# Patient Record
Sex: FEMALE | Race: WHITE | Employment: FULL TIME | URBAN - METROPOLITAN AREA
[De-identification: names, ages, dates, MRNs, and addresses within clinical notes are randomized per-mention and may not be internally consistent; named-entity substitution may affect disease eponyms.]

---

## 2020-01-11 ENCOUNTER — HOSPITAL ENCOUNTER (EMERGENCY)
Age: 50
Discharge: HOME | End: 2020-01-11
Attending: EMERGENCY MEDICINE
Payer: COMMERCIAL

## 2020-01-11 VITALS
SYSTOLIC BLOOD PRESSURE: 138 MMHG | HEIGHT: 68 IN | WEIGHT: 185 LBS | DIASTOLIC BLOOD PRESSURE: 59 MMHG | OXYGEN SATURATION: 97 % | HEART RATE: 66 BPM | BODY MASS INDEX: 28.04 KG/M2 | TEMPERATURE: 97.5 F | RESPIRATION RATE: 16 BRPM

## 2020-01-11 LAB
ABSOLUTE EOS #: 0.15 K/UL (ref 0–0.44)
ABSOLUTE IMMATURE GRANULOCYTE: 0.03 K/UL (ref 0–0.3)
ABSOLUTE LYMPH #: 2.51 K/UL (ref 1.1–3.7)
ABSOLUTE MONO #: 0.37 K/UL (ref 0.1–1.2)
ANION GAP SERPL CALCULATED.3IONS-SCNC: 13 MMOL/L (ref 9–17)
BASOPHILS # BLD: 1 % (ref 0–2)
BASOPHILS ABSOLUTE: 0.04 K/UL (ref 0–0.2)
BUN BLDV-MCNC: 14 MG/DL (ref 6–20)
BUN/CREAT BLD: 22 (ref 9–20)
CALCIUM SERPL-MCNC: 9.2 MG/DL (ref 8.6–10.4)
CHLORIDE BLD-SCNC: 100 MMOL/L (ref 98–107)
CO2: 26 MMOL/L (ref 20–31)
CREAT SERPL-MCNC: 0.63 MG/DL (ref 0.5–0.9)
DIFFERENTIAL TYPE: ABNORMAL
EOSINOPHILS RELATIVE PERCENT: 2 % (ref 1–4)
GFR AFRICAN AMERICAN: >60 ML/MIN
GFR NON-AFRICAN AMERICAN: >60 ML/MIN
GFR SERPL CREATININE-BSD FRML MDRD: ABNORMAL ML/MIN/{1.73_M2}
GFR SERPL CREATININE-BSD FRML MDRD: ABNORMAL ML/MIN/{1.73_M2}
GLUCOSE BLD-MCNC: 110 MG/DL (ref 70–99)
HCT VFR BLD CALC: 53.7 % (ref 36.3–47.1)
HEMOGLOBIN: 17.4 G/DL (ref 11.9–15.1)
IMMATURE GRANULOCYTES: 0 %
LYMPHOCYTES # BLD: 29 % (ref 24–43)
MCH RBC QN AUTO: 28.2 PG (ref 25.2–33.5)
MCHC RBC AUTO-ENTMCNC: 32.4 G/DL (ref 28.4–34.8)
MCV RBC AUTO: 87.2 FL (ref 82.6–102.9)
MONOCYTES # BLD: 4 % (ref 3–12)
MYOGLOBIN: 30 NG/ML (ref 25–58)
NRBC AUTOMATED: 0 PER 100 WBC
PDW BLD-RTO: 12.2 % (ref 11.8–14.4)
PLATELET # BLD: 223 K/UL (ref 138–453)
PLATELET ESTIMATE: ABNORMAL
PMV BLD AUTO: 9.5 FL (ref 8.1–13.5)
POTASSIUM SERPL-SCNC: 3.7 MMOL/L (ref 3.7–5.3)
RBC # BLD: 6.16 M/UL (ref 3.95–5.11)
RBC # BLD: ABNORMAL 10*6/UL
SEG NEUTROPHILS: 64 % (ref 36–65)
SEGMENTED NEUTROPHILS ABSOLUTE COUNT: 5.6 K/UL (ref 1.5–8.1)
SODIUM BLD-SCNC: 139 MMOL/L (ref 135–144)
TROPONIN INTERP: NORMAL
TROPONIN T: NORMAL NG/ML
TROPONIN, HIGH SENSITIVITY: <6 NG/L (ref 0–14)
WBC # BLD: 8.7 K/UL (ref 3.5–11.3)
WBC # BLD: ABNORMAL 10*3/UL

## 2020-01-11 PROCEDURE — 96360 HYDRATION IV INFUSION INIT: CPT

## 2020-01-11 PROCEDURE — 80048 BASIC METABOLIC PNL TOTAL CA: CPT

## 2020-01-11 PROCEDURE — 93005 ELECTROCARDIOGRAM TRACING: CPT | Performed by: PHYSICIAN ASSISTANT

## 2020-01-11 PROCEDURE — 84484 ASSAY OF TROPONIN QUANT: CPT

## 2020-01-11 PROCEDURE — 96361 HYDRATE IV INFUSION ADD-ON: CPT

## 2020-01-11 PROCEDURE — 85025 COMPLETE CBC W/AUTO DIFF WBC: CPT

## 2020-01-11 PROCEDURE — 83874 ASSAY OF MYOGLOBIN: CPT

## 2020-01-11 PROCEDURE — 99284 EMERGENCY DEPT VISIT MOD MDM: CPT

## 2020-01-11 PROCEDURE — 2580000003 HC RX 258: Performed by: PHYSICIAN ASSISTANT

## 2020-01-11 RX ORDER — ATORVASTATIN CALCIUM 20 MG/1
20 TABLET, FILM COATED ORAL DAILY
COMMUNITY

## 2020-01-11 RX ORDER — 0.9 % SODIUM CHLORIDE 0.9 %
1000 INTRAVENOUS SOLUTION INTRAVENOUS ONCE
Status: COMPLETED | OUTPATIENT
Start: 2020-01-11 | End: 2020-01-11

## 2020-01-11 RX ORDER — GABAPENTIN 300 MG/1
300 CAPSULE ORAL 4 TIMES DAILY
COMMUNITY

## 2020-01-11 RX ADMIN — SODIUM CHLORIDE 1000 ML: 9 INJECTION, SOLUTION INTRAVENOUS at 17:59

## 2020-01-11 NOTE — ED PROVIDER NOTES
Never       I personally evaluated and examined the patient in conjunction with the APC and agree with the assessment, treatment plan, and disposition of the patient as recorded by the APC.    Charly Gruber MD  Attending Emergency Physician          Darell Hua MD  01/11/20 0228

## 2020-01-11 NOTE — ED PROVIDER NOTES
level 4, 8 or more for level 5)     ED Triage Vitals [01/11/20 1752]   BP Temp Temp Source Pulse Resp SpO2 Height Weight   (!) 138/59 97.5 °F (36.4 °C) Oral 66 16 97 % 5' 8\" (1.727 m) 185 lb (83.9 kg)      Physical Exam  Constitutional:       Appearance: She is well-developed. HENT:      Head: Normocephalic and atraumatic. Neck:      Musculoskeletal: Normal range of motion and neck supple. Cardiovascular:      Rate and Rhythm: Normal rate and regular rhythm. Pulmonary:      Effort: Pulmonary effort is normal.      Breath sounds: Normal breath sounds. Abdominal:      Palpations: Abdomen is soft. Tenderness: There is no tenderness. Musculoskeletal: Normal range of motion. Skin:     General: Skin is warm. Findings: No rash. Neurological:      Mental Status: She is alert and oriented to person, place, and time.    Psychiatric:         Behavior: Behavior normal.                 DIAGNOSTIC RESULTS     EKG: All EKG's are interpreted by the Emergency Department Physician who either signs or Co-signs this chart in the absence of a cardiologist.    Charmaine Jarod .  66 bpm.  nsr  No st segment elevation    RADIOLOGY:   Non-plain film images such as CT, Ultrasound and MRI are read by the radiologist. Plain radiographic images arevisualized and preliminarily interpreted by the emergency physician with the below findings:        Interpretation per the Radiologist below, if available at thetime of this note:          ED BEDSIDE ULTRASOUND:   Performed by ED Physician - none    LABS:  Labs Reviewed   CBC WITH AUTO DIFFERENTIAL - Abnormal; Notable for the following components:       Result Value    RBC 6.16 (*)     Hemoglobin 17.4 (*)     Hematocrit 53.7 (*)     All other components within normal limits   BASIC METABOLIC PANEL - Abnormal; Notable for the following components:    Glucose 110 (*)     Bun/Cre Ratio 22 (*)     All other components within normal limits   TROP/MYOGLOBIN   TROP/MYOGLOBIN TROP/MYOGLOBIN       All other labs were within normal range or not returned as of this dictation. EMERGENCY DEPARTMENT COURSE and DIFFERENTIAL DIAGNOSIS/MDM:   Vitals:    Vitals:    01/11/20 1752   BP: (!) 138/59   Pulse: 66   Resp: 16   Temp: 97.5 °F (36.4 °C)   TempSrc: Oral   SpO2: 97%   Weight: 185 lb (83.9 kg)   Height: 5' 8\" (1.727 m)     Patient ambulate without difficulty. Feels much better after IV fluids. Work-up negative. Patient likely had a vasovagal event. Will discharge home. CONSULTS:  None    PROCEDURES:  Procedures        FINAL IMPRESSION      1.  Syncope and collapse          DISPOSITION/PLAN   DISPOSITION Decision To Discharge 01/11/2020 08:05:47 PM      PATIENTREFERRED TO:   Sandra Leach, [address]  [phone]    In 3 days        DISCHARGE MEDICATIONS:     New Prescriptions    No medications on file           (Please note that portions of this note were completed with a voice recognition program.  Efforts were made to edit thedictations but occasionally words are mis-transcribed.)    CHRISTINE Arteaga PA-C  01/11/20 2007

## 2020-01-12 LAB
EKG ATRIAL RATE: 66 BPM
EKG P AXIS: 59 DEGREES
EKG P-R INTERVAL: 146 MS
EKG Q-T INTERVAL: 394 MS
EKG QRS DURATION: 90 MS
EKG QTC CALCULATION (BAZETT): 413 MS
EKG R AXIS: 52 DEGREES
EKG T AXIS: 51 DEGREES
EKG VENTRICULAR RATE: 66 BPM

## 2020-06-13 ENCOUNTER — HOSPITAL ENCOUNTER (EMERGENCY)
Age: 50
Discharge: HOME | End: 2020-06-13
Attending: EMERGENCY MEDICINE
Payer: COMMERCIAL

## 2020-06-13 ENCOUNTER — APPOINTMENT (OUTPATIENT)
Dept: GENERAL RADIOLOGY | Age: 50
End: 2020-06-13
Payer: COMMERCIAL

## 2020-06-13 VITALS
HEART RATE: 86 BPM | DIASTOLIC BLOOD PRESSURE: 79 MMHG | HEIGHT: 68 IN | TEMPERATURE: 98.6 F | RESPIRATION RATE: 16 BRPM | WEIGHT: 190 LBS | SYSTOLIC BLOOD PRESSURE: 139 MMHG | BODY MASS INDEX: 28.79 KG/M2 | OXYGEN SATURATION: 97 %

## 2020-06-13 PROCEDURE — 6360000002 HC RX W HCPCS: Performed by: EMERGENCY MEDICINE

## 2020-06-13 PROCEDURE — 99283 EMERGENCY DEPT VISIT LOW MDM: CPT

## 2020-06-13 PROCEDURE — 6370000000 HC RX 637 (ALT 250 FOR IP): Performed by: EMERGENCY MEDICINE

## 2020-06-13 PROCEDURE — 96372 THER/PROPH/DIAG INJ SC/IM: CPT

## 2020-06-13 PROCEDURE — 73590 X-RAY EXAM OF LOWER LEG: CPT

## 2020-06-13 RX ORDER — ACETAMINOPHEN 500 MG
1000 TABLET ORAL ONCE
Status: COMPLETED | OUTPATIENT
Start: 2020-06-13 | End: 2020-06-13

## 2020-06-13 RX ORDER — KETOROLAC TROMETHAMINE 30 MG/ML
30 INJECTION, SOLUTION INTRAMUSCULAR; INTRAVENOUS ONCE
Status: COMPLETED | OUTPATIENT
Start: 2020-06-13 | End: 2020-06-13

## 2020-06-13 RX ADMIN — ACETAMINOPHEN 1000 MG: 500 TABLET ORAL at 13:17

## 2020-06-13 RX ADMIN — KETOROLAC TROMETHAMINE 30 MG: 30 INJECTION, SOLUTION INTRAMUSCULAR at 13:17

## 2020-06-13 ASSESSMENT — PAIN DESCRIPTION - LOCATION: LOCATION: LEG

## 2020-06-13 ASSESSMENT — PAIN SCALES - GENERAL
PAINLEVEL_OUTOF10: 7
PAINLEVEL_OUTOF10: 9
PAINLEVEL_OUTOF10: 9

## 2020-06-13 ASSESSMENT — PAIN DESCRIPTION - FREQUENCY: FREQUENCY: CONTINUOUS

## 2020-06-13 ASSESSMENT — PAIN DESCRIPTION - PAIN TYPE: TYPE: ACUTE PAIN

## 2020-06-13 ASSESSMENT — PAIN DESCRIPTION - DESCRIPTORS: DESCRIPTORS: CONSTANT;THROBBING;SPASM

## 2020-06-13 ASSESSMENT — PAIN DESCRIPTION - ORIENTATION: ORIENTATION: RIGHT

## 2020-06-13 NOTE — ED PROVIDER NOTES
EMERGENCY DEPARTMENT ENCOUNTER    Pt Name: cT Desouza  MRN: [id number]  [place] 1970  Date of evaluation: 20  CHIEF COMPLAINT       Chief Complaint   Patient presents with    Leg Injury     right calf     HISTORY OF PRESENT ILLNESS   Patient is a 79-year-old female who presents to the ED complaining of right calf pain. Pain started 2 weeks ago when she slipped on tile was bordering her swimming pool. Pain gradually improved until today when she was swimming in the water, pushed off her right leg to avoid an object and developed sharp tearing pain in her right calf. She had to hobble approximately 1 mile back to her car worsening her symptoms. Pain aggravated when putting weight on her leg. Also aggravated when flexing or extending her ankle. No bony deformities, swelling or redness. No numbness or tingling in lower extremities. She does not fever, cough, chest pain, shortness of breath, dental pain. REVIEW OF SYSTEMS     Review of Systems   All other systems reviewed and are negative. PASTMEDICAL HISTORY     Past Medical History:   Diagnosis Date    Degenerative disc disease, cervical     Hyperlipidemia      SURGICAL HISTORY       Past Surgical History:   Procedure Laterality Date     SECTION      NECK SURGERY      TUBAL LIGATION       CURRENT MEDICATIONS       Previous Medications    ATORVASTATIN (LIPITOR) 20 MG TABLET    Take 20 mg by mouth daily    GABAPENTIN (NEURONTIN) 300 MG CAPSULE    Take 300 mg by mouth 4 times daily. ALLERGIES     has No Known Allergies. FAMILY HISTORY     has no family status information on file.       SOCIAL HISTORY       Social History     Tobacco Use    Smoking status: Never Smoker    Smokeless tobacco: Never Used   Substance Use Topics    Alcohol use: Yes     Comment: rare    Drug use: Never     PHYSICAL EXAM     INITIAL VITALS: /79   Pulse 86   Temp 98.6 °F (37 °C) (Oral)   Resp 16   Ht 5' 8\" (1.727 m)   Wt 190 lb (86.2 kg) LMP 05/27/2020   SpO2 97%   BMI 28.89 kg/m²    Physical Exam  HENT:      Head: Normocephalic. Right Ear: External ear normal.      Left Ear: External ear normal.      Nose: Nose normal.   Eyes:      Conjunctiva/sclera: Conjunctivae normal.   Cardiovascular:      Rate and Rhythm: Normal rate. Pulmonary:      Effort: Pulmonary effort is normal.   Abdominal:      General: Abdomen is flat. Musculoskeletal:         General: Tenderness present. Comments: Tender right mid to distal calf. Mild swelling compared to left. Tenderness on flexion and extension of foot. Normal flexion extension of right hip and knee. Skin:     General: Skin is dry. Neurological:      Mental Status: She is alert. Mental status is at baseline. Psychiatric:         Mood and Affect: Mood normal.         Behavior: Behavior normal.         MEDICAL DECISION MAKING:   The patient is hemodynamically stable, afebrile, nontoxic-appearing. Physical exam notable for calf tenderness. Based on history and exam high clinical suspicion for gastrocnemius muscle tear. Low suspicion for Achilles tendon tear. ED plan for x-ray, ultrasound if available, walking boot, crutches. DIAGNOSTIC RESULTS   EKG:All EKG's are interpreted by the Emergency Department Physician who either signs or Co-signs this chart in the absence of a cardiologist.        RADIOLOGY:All plain film, CT, MRI, and formal ultrasound images (except ED bedside ultrasound) are read by the radiologist, see reports below, unless otherwisenoted in MDM or here. XR TIBIA FIBULA RIGHT (2 VIEWS)   Final Result   Negative right tibia/fibula with no acute osseous abnormality. US EXTREMITY RIGHT NON VASC LIMITED    (Results Pending)     LABS: All lab results were reviewed by myself, and all abnormals are listed below. Labs Reviewed - No data to display    EMERGENCY DEPARTMENTCOURSE:   Nursing notes reviewed.   At this time this is what I find, the patient appears

## 2023-01-11 ENCOUNTER — HOSPITAL ENCOUNTER (EMERGENCY)
Age: 53
Discharge: HOME | End: 2023-01-11
Attending: EMERGENCY MEDICINE
Payer: COMMERCIAL

## 2023-01-11 ENCOUNTER — APPOINTMENT (OUTPATIENT)
Dept: GENERAL RADIOLOGY | Age: 53
End: 2023-01-11
Payer: COMMERCIAL

## 2023-01-11 ENCOUNTER — APPOINTMENT (OUTPATIENT)
Dept: MRI IMAGING | Age: 53
End: 2023-01-11
Payer: COMMERCIAL

## 2023-01-11 VITALS
OXYGEN SATURATION: 97 % | TEMPERATURE: 97.3 F | HEIGHT: 68 IN | DIASTOLIC BLOOD PRESSURE: 86 MMHG | BODY MASS INDEX: 28.49 KG/M2 | WEIGHT: 188 LBS | RESPIRATION RATE: 20 BRPM | HEART RATE: 70 BPM | SYSTOLIC BLOOD PRESSURE: 139 MMHG

## 2023-01-11 DIAGNOSIS — R53.83 OTHER FATIGUE: Primary | ICD-10-CM

## 2023-01-11 LAB
ABSOLUTE EOS #: 0.11 K/UL (ref 0–0.44)
ABSOLUTE IMMATURE GRANULOCYTE: 0.02 K/UL (ref 0–0.3)
ABSOLUTE LYMPH #: 1.73 K/UL (ref 1.1–3.7)
ABSOLUTE MONO #: 0.3 K/UL (ref 0.1–1.2)
ALBUMIN SERPL-MCNC: 4.6 G/DL (ref 3.5–5.2)
ALP BLD-CCNC: 83 U/L (ref 35–104)
ALT SERPL-CCNC: 20 U/L (ref 5–33)
ANION GAP SERPL CALCULATED.3IONS-SCNC: 10 MMOL/L (ref 9–17)
AST SERPL-CCNC: 21 U/L
BASOPHILS # BLD: 0 % (ref 0–2)
BASOPHILS ABSOLUTE: <0.03 K/UL (ref 0–0.2)
BILIRUB SERPL-MCNC: 0.4 MG/DL (ref 0.3–1.2)
BUN BLDV-MCNC: 20 MG/DL (ref 6–20)
BUN/CREAT BLD: 29 (ref 9–20)
CALCIUM SERPL-MCNC: 9.4 MG/DL (ref 8.6–10.4)
CHLORIDE BLD-SCNC: 102 MMOL/L (ref 98–107)
CO2: 28 MMOL/L (ref 20–31)
CREAT SERPL-MCNC: 0.68 MG/DL (ref 0.5–0.9)
EKG ATRIAL RATE: 71 BPM
EKG P AXIS: 55 DEGREES
EKG P-R INTERVAL: 142 MS
EKG Q-T INTERVAL: 394 MS
EKG QRS DURATION: 90 MS
EKG QTC CALCULATION (BAZETT): 428 MS
EKG R AXIS: 42 DEGREES
EKG T AXIS: 42 DEGREES
EKG VENTRICULAR RATE: 71 BPM
EOSINOPHILS RELATIVE PERCENT: 2 % (ref 1–4)
GFR SERPL CREATININE-BSD FRML MDRD: >60 ML/MIN/1.73M2
GLUCOSE BLD-MCNC: 102 MG/DL (ref 70–99)
HCT VFR BLD CALC: 45 % (ref 36.3–47.1)
HEMOGLOBIN: 14.9 G/DL (ref 11.9–15.1)
IMMATURE GRANULOCYTES: 0 %
LYMPHOCYTES # BLD: 36 % (ref 24–43)
MAGNESIUM: 2 MG/DL (ref 1.6–2.6)
MCH RBC QN AUTO: 29.4 PG (ref 25.2–33.5)
MCHC RBC AUTO-ENTMCNC: 33.1 G/DL (ref 28.4–34.8)
MCV RBC AUTO: 88.9 FL (ref 82.6–102.9)
MONOCYTES # BLD: 6 % (ref 3–12)
NRBC AUTOMATED: 0 PER 100 WBC
PDW BLD-RTO: 12.7 % (ref 11.8–14.4)
PLATELET # BLD: 220 K/UL (ref 138–453)
PMV BLD AUTO: 9.5 FL (ref 8.1–13.5)
POTASSIUM SERPL-SCNC: 4 MMOL/L (ref 3.7–5.3)
RBC # BLD: 5.06 M/UL (ref 3.95–5.11)
SEG NEUTROPHILS: 56 % (ref 36–65)
SEGMENTED NEUTROPHILS ABSOLUTE COUNT: 2.59 K/UL (ref 1.5–8.1)
SODIUM BLD-SCNC: 140 MMOL/L (ref 135–144)
TOTAL PROTEIN: 7.2 G/DL (ref 6.4–8.3)
TROPONIN, HIGH SENSITIVITY: <6 NG/L (ref 0–14)
TSH SERPL DL<=0.05 MIU/L-ACNC: 0.71 UIU/ML (ref 0.3–5)
WBC # BLD: 4.8 K/UL (ref 3.5–11.3)

## 2023-01-11 PROCEDURE — A9579 GAD-BASE MR CONTRAST NOS,1ML: HCPCS | Performed by: EMERGENCY MEDICINE

## 2023-01-11 PROCEDURE — 84443 ASSAY THYROID STIM HORMONE: CPT

## 2023-01-11 PROCEDURE — 83735 ASSAY OF MAGNESIUM: CPT

## 2023-01-11 PROCEDURE — 96375 TX/PRO/DX INJ NEW DRUG ADDON: CPT

## 2023-01-11 PROCEDURE — 85025 COMPLETE CBC W/AUTO DIFF WBC: CPT

## 2023-01-11 PROCEDURE — 6360000002 HC RX W HCPCS: Performed by: EMERGENCY MEDICINE

## 2023-01-11 PROCEDURE — 93010 ELECTROCARDIOGRAM REPORT: CPT | Performed by: INTERNAL MEDICINE

## 2023-01-11 PROCEDURE — 96374 THER/PROPH/DIAG INJ IV PUSH: CPT

## 2023-01-11 PROCEDURE — 99285 EMERGENCY DEPT VISIT HI MDM: CPT

## 2023-01-11 PROCEDURE — 70553 MRI BRAIN STEM W/O & W/DYE: CPT

## 2023-01-11 PROCEDURE — 84484 ASSAY OF TROPONIN QUANT: CPT

## 2023-01-11 PROCEDURE — 80053 COMPREHEN METABOLIC PANEL: CPT

## 2023-01-11 PROCEDURE — 71045 X-RAY EXAM CHEST 1 VIEW: CPT

## 2023-01-11 PROCEDURE — 93005 ELECTROCARDIOGRAM TRACING: CPT | Performed by: EMERGENCY MEDICINE

## 2023-01-11 PROCEDURE — 2580000003 HC RX 258: Performed by: EMERGENCY MEDICINE

## 2023-01-11 PROCEDURE — 6360000004 HC RX CONTRAST MEDICATION: Performed by: EMERGENCY MEDICINE

## 2023-01-11 RX ORDER — SODIUM CHLORIDE 0.9 % (FLUSH) 0.9 %
10 SYRINGE (ML) INJECTION ONCE
Status: COMPLETED | OUTPATIENT
Start: 2023-01-11 | End: 2023-01-11

## 2023-01-11 RX ORDER — LORAZEPAM 2 MG/ML
0.5 INJECTION INTRAMUSCULAR ONCE
Status: COMPLETED | OUTPATIENT
Start: 2023-01-11 | End: 2023-01-11

## 2023-01-11 RX ADMIN — GADOTERIDOL 16 ML: 279.3 INJECTION, SOLUTION INTRAVENOUS at 15:41

## 2023-01-11 RX ADMIN — LORAZEPAM 0.5 MG: 2 INJECTION INTRAMUSCULAR at 14:49

## 2023-01-11 RX ADMIN — SODIUM CHLORIDE, PRESERVATIVE FREE 10 ML: 5 INJECTION INTRAVENOUS at 15:42

## 2023-01-11 ASSESSMENT — ENCOUNTER SYMPTOMS
SHORTNESS OF BREATH: 0
SINUS PAIN: 0
APNEA: 0
VOMITING: 0
COLOR CHANGE: 0
ABDOMINAL DISTENTION: 0
DIARRHEA: 0
CONSTIPATION: 0
CHEST TIGHTNESS: 0

## 2023-01-11 ASSESSMENT — PAIN SCALES - GENERAL: PAINLEVEL_OUTOF10: 0

## 2023-01-11 ASSESSMENT — PAIN - FUNCTIONAL ASSESSMENT: PAIN_FUNCTIONAL_ASSESSMENT: 0-10

## 2023-01-11 NOTE — ED PROVIDER NOTES
EMERGENCY DEPARTMENT ENCOUNTER    Pt Name: David Rivera  MRN: [id number]  [place] 1970  Date of evaluation: 1/11/23  CHIEF COMPLAINT       Chief Complaint   Patient presents with    Fatigue    Altered Mental Status     Episodic. Reports she is concerned for mini strokes. Reports 5 or 6 episodes of extreme fatigue, arm heaviness, and feeling like sounds are far away. Last one this AM and lasted for 10-15 minutes    Blurred Vision     During episodes     HISTORY OF PRESENT ILLNESS   65-year-old female presents emergency room with episodes over the last 2 months with increasing frequency. Patient reports that she will get a sudden onset of extreme fatigue bilateral arm heaviness blurred vision and feeling like sounds are far away. She reports that during this time her eyes are very heavy and she can barely keep her eyes open. These last about 10 to 15 minutes. She has had about 1 a week although yesterday had 2 and 1 today. She has recovered every time without intervention. No chest discomfort or shortness of breath with it. Symptoms are bilateral.  Patient reports history of cervical issues and hyperlipidemia. REVIEW OF SYSTEMS     Review of Systems   Constitutional:  Positive for activity change and fatigue. Negative for chills and diaphoresis. HENT:  Negative for congestion, sinus pain and tinnitus. Eyes:  Positive for visual disturbance. Respiratory:  Negative for apnea, chest tightness and shortness of breath. Gastrointestinal:  Negative for abdominal distention, constipation, diarrhea and vomiting. Genitourinary:  Negative for difficulty urinating and frequency. Musculoskeletal:  Negative for arthralgias and myalgias. Skin:  Negative for color change and rash. Neurological:  Negative for dizziness. Hematological: Negative. Psychiatric/Behavioral: Negative.      PASTMEDICAL HISTORY     Past Medical History:   Diagnosis Date    Degenerative disc disease, cervical Hyperlipidemia      Past Problem List  There is no problem list on file for this patient. SURGICAL HISTORY       Past Surgical History:   Procedure Laterality Date     SECTION      NECK SURGERY      TUBAL LIGATION       CURRENT MEDICATIONS       Previous Medications    ATORVASTATIN (LIPITOR) 20 MG TABLET    Take 20 mg by mouth daily    GABAPENTIN (NEURONTIN) 300 MG CAPSULE    Take 300 mg by mouth 4 times daily. ALLERGIES     has No Known Allergies. FAMILY HISTORY     has no family status information on file. SOCIAL HISTORY       Social History     Tobacco Use    Smoking status: Never    Smokeless tobacco: Never   Vaping Use    Vaping Use: Never used   Substance Use Topics    Alcohol use: Yes     Comment: rare    Drug use: Never     PHYSICAL EXAM     INITIAL VITALS: /86   Pulse 70   Temp 97.3 °F (36.3 °C)   Resp 20   Ht 5' 8\" (1.727 m)   Wt 188 lb (85.3 kg)   SpO2 97%   BMI 28.59 kg/m²    Physical Exam  Constitutional:       General: She is not in acute distress. Appearance: She is well-developed. HENT:      Head: Normocephalic. Eyes:      Pupils: Pupils are equal, round, and reactive to light. Cardiovascular:      Rate and Rhythm: Normal rate and regular rhythm. Heart sounds: Normal heart sounds. Pulmonary:      Effort: Pulmonary effort is normal. No respiratory distress. Breath sounds: Normal breath sounds. Abdominal:      General: Bowel sounds are normal.      Palpations: Abdomen is soft. Tenderness: There is no abdominal tenderness. Musculoskeletal:         General: Normal range of motion. Skin:     General: Skin is warm and dry. Neurological:      Mental Status: She is alert and oriented to person, place, and time. Comments: NIH Stroke Scale  Interval: Baseline  NIH Stroke Scale All Negative: Yes  Level of Consciousness (1a): Alert  LOC Questions (1b):  Answers both correctly  LOC Commands (1c): Performs both tasks correctly  Best Gaze (2): Normal  Visual (3): No visual loss  Facial Palsy (4): Normal symmetrical movement  Motor Arm, Left (5a): No drift  Motor Arm, Right (5b): No drift  Motor Leg, Left (6a): No drift  Motor Leg, Right (6b): No drift  Limb Ataxia (7): Absent  Sensory (8): Normal  Best Language (9): No aphasia  Dysarthria (10): Normal  Extinction and Inattention (11): No abnormality  Total: 0       MEDICAL DECISION MAKING / ED COURSE:   Summary of Patient Presentation:    76-year-old female presenting to the emergency room for episodes of fatigue arm heaviness and blurred vision. Patient is asymptomatic at time of presentation. Vitals are unremarkable. Patient has normal neurologic exam.  Labs are unremarkable. Patient has unremarkable MRI here in the ED. There is no indication for admission at this time. Symptoms may be related to orthostatic hypotension however patient is not experiencing symptoms at this time. Patient courage to follow-up with primary care provider. 1)  Number and Complexity of Problems  Problem List This Visit: Episodes of extreme fatigue and vision changes with heaviness in the arms-intermittent    Differential Diagnosis: Metabolic or endocrine disorder, syncopal episodes    Diagnoses Considered but Do Not Suspect: TIAs    Pertinent Comorbid Conditions: Hyperlipidemia    2)  Data Reviewed  My EKG interpretation: Sinus rhythm ventricular rate 71  No significant ST or T wave changes    QTc 428      3)  Treatment and Disposition    Patient remains asymptomatic. Patient updated on labs and imaging results. She has an unremarkable work-up here in the ED. Vitals are unremarkable. Given work-up patient will be discharged with PCP follow-up. Patient is aware of and agreeable to this plan.       \"ED Course\" Notes From Epic Narrator:         CRITICAL CARE:       PROCEDURES:    Procedures      DATA FOR LAB AND RADIOLOGY TESTS ORDERED BELOW ARE REVIEWED BY THE ED CLINICIAN:    RADIOLOGY: All x-rays, CT, MRI, and formal ultrasound images (except ED bedside ultrasound) are read by the radiologist, see reports below, unless otherwise noted in MDM or here. Reports below are reviewed by myself. MRI BRAIN W WO CONTRAST   Preliminary Result   No acute intracranial abnormality. XR CHEST PORTABLE   Final Result   No acute process. LABS: Lab orders shown below, the results are reviewed by myself, and all abnormals are listed below. Labs Reviewed   COMPREHENSIVE METABOLIC PANEL - Abnormal; Notable for the following components:       Result Value    Glucose 102 (*)     Bun/Cre Ratio 29 (*)     All other components within normal limits   CBC WITH AUTO DIFFERENTIAL   MAGNESIUM   TROPONIN   TSH       Vitals Reviewed:    Vitals:    01/11/23 1342 01/11/23 1343   BP: 139/86    Pulse: 70    Resp: 20    Temp: 97.3 °F (36.3 °C)    SpO2: 97%    Weight:  188 lb (85.3 kg)   Height:  5' 8\" (1.727 m)     MEDICATIONS GIVEN TO PATIENT THIS ENCOUNTER:  Orders Placed This Encounter   Medications    LORazepam (ATIVAN) injection 0.5 mg    gadoteridol (PROHANCE) injection 16 mL    sodium chloride flush 0.9 % injection 10 mL     DISCHARGE PRESCRIPTIONS:  New Prescriptions    No medications on file     PHYSICIAN CONSULTS ORDERED THIS ENCOUNTER:  IP CONSULT TO NEUROLOGY  FINAL IMPRESSION      1.  Other fatigue          DISPOSITION/PLAN   DISPOSITION Decision To Discharge 01/11/2023 04:28:38 PM      OUTPATIENT FOLLOW UP THE PATIENT:  Janet Perez, [address]  [phone]    Schedule an appointment as soon as possible for a visit in 1 week      MD Alida Real MD  01/11/23 9794

## 2023-01-11 NOTE — DISCHARGE INSTRUCTIONS
I recommend when these episodes do occur to try sitting down drinking some fluid. Drinking something with some sugar in it may help alleviate symptoms sooner. Etiology is unclear at this time however your work-up here in the emergency room was unremarkable. I recommend follow-up with your primary care provider.

## 2023-11-28 ENCOUNTER — OFFICE VISIT (OUTPATIENT)
Dept: NEUROLOGY | Age: 53
End: 2023-11-28
Payer: COMMERCIAL

## 2023-11-28 VITALS
HEIGHT: 69 IN | HEART RATE: 71 BPM | BODY MASS INDEX: 25.21 KG/M2 | DIASTOLIC BLOOD PRESSURE: 73 MMHG | SYSTOLIC BLOOD PRESSURE: 121 MMHG | WEIGHT: 170.2 LBS

## 2023-11-28 DIAGNOSIS — G93.0 ARACHNOID CYST: ICD-10-CM

## 2023-11-28 DIAGNOSIS — M54.12 CERVICAL RADICULOPATHY: Primary | ICD-10-CM

## 2023-11-28 PROCEDURE — 99204 OFFICE O/P NEW MOD 45 MIN: CPT | Performed by: PHYSICIAN ASSISTANT

## 2023-11-28 RX ORDER — VALACYCLOVIR HYDROCHLORIDE 500 MG/1
500 TABLET, FILM COATED ORAL DAILY
COMMUNITY

## 2023-11-28 RX ORDER — PROGESTERONE 100 MG/1
CAPSULE ORAL
COMMUNITY

## 2023-11-28 RX ORDER — ONDANSETRON 4 MG/1
4 TABLET, ORALLY DISINTEGRATING ORAL
COMMUNITY
Start: 2019-01-11

## 2023-11-28 RX ORDER — ESTRADIOL 1 MG/1
1 TABLET ORAL DAILY
COMMUNITY

## 2023-11-28 RX ORDER — CYANOCOBALAMIN (VITAMIN B-12) 500 MCG
2400 TABLET ORAL DAILY
COMMUNITY

## 2023-11-28 RX ORDER — GABAPENTIN 600 MG/1
600 TABLET ORAL 3 TIMES DAILY
COMMUNITY
Start: 2023-10-18 | End: 2023-11-28 | Stop reason: SDUPTHER

## 2023-11-28 RX ORDER — GABAPENTIN 600 MG/1
TABLET ORAL
Qty: 90 TABLET | Refills: 5 | Status: SHIPPED | OUTPATIENT
Start: 2023-11-28 | End: 2024-05-26

## 2023-11-28 RX ORDER — IBUPROFEN 800 MG/1
TABLET ORAL
COMMUNITY
Start: 2023-09-12

## 2023-11-28 RX ORDER — FENOFIBRATE 160 MG/1
160 TABLET ORAL DAILY
COMMUNITY
Start: 2023-09-29

## 2023-11-28 NOTE — PATIENT INSTRUCTIONS
Magnesium oxide 400 mg qhs for leg cramps  Consider b12 500-1000 mcg in AM for fatigue    Watch for new weakness or numbness

## 2023-11-28 NOTE — PROGRESS NOTES
[address] [facility]  Dept: [phone]    PATIENT NAME: Hira Mohr  PATIENT MRN: [id number]  PRIMARY CARE PHYSICIAN: Ying Morales PA-C    HPI:      Hira Mohr is a 46 y.o. female who presents to clinic today for evaluation of cervical radiculopathy with history of cervical fusion with Dr. Hilda Raymond in 2018. Previously followed with Dr. Gladys Carrasco at [facility]. Presently managed on Gabapentin 600 mg TID for management of cervical radiculopathy symptoms affecting left arm. Today she reports that at her last appointment with Tiffany Negrete CNP, she had gabapentin increased for breakthrough radicular pain. She notes that symptoms are generally managed with Gabapentin, but she develops breakthrough if she is late for any dose of medication. Fortunately, she tolerates this medication well and has never noticed sleepiness, dizziness, weight changes. Her discomfort is described as a deep ache radiating down the lateral aspect of her arm to dorsal 2nd,3rd finger ache. She has very infrequent cramping of forearm, triggered by certain positioning. Denies hand numbness or arm/ hand weakness. Denies skin discoloration. No history of injections through pain management. Prior to her surgery she had very poor response to traction and other physical therapy exercises. She is averse to therapeutic injections. Oct 2023 labs: CK normal at 58, B12 416, Iron 118, TIBC mild elevation at 441, TSH 0.83, CBC/CMP WNL    Last MRI cervical was through [facility] in 2022. PREVIOUS WORKUP:     MRI brain w/wo Jan 2023:  There is no acute infarct. No mass  effect or midline shift. No evidence of an acute intracranial hemorrhage. The ventricles and sulci are normal in size and configuration. The  sellar/suprasellar regions appear unremarkable. The normal signal voids  within the major intracranial vessels appear maintained. No abnormal focus  of enhancement is seen within the brain.   A 17

## 2024-05-30 ENCOUNTER — OFFICE VISIT (OUTPATIENT)
Dept: NEUROLOGY | Age: 54
End: 2024-05-30
Payer: COMMERCIAL

## 2024-05-30 VITALS
SYSTOLIC BLOOD PRESSURE: 123 MMHG | HEIGHT: 68 IN | DIASTOLIC BLOOD PRESSURE: 79 MMHG | BODY MASS INDEX: 25.61 KG/M2 | WEIGHT: 169 LBS | HEART RATE: 69 BPM

## 2024-05-30 DIAGNOSIS — Z98.890 HISTORY OF CERVICAL SPINAL SURGERY: ICD-10-CM

## 2024-05-30 DIAGNOSIS — M54.12 CERVICAL RADICULOPATHY: Primary | ICD-10-CM

## 2024-05-30 PROCEDURE — 99214 OFFICE O/P EST MOD 30 MIN: CPT | Performed by: PHYSICIAN ASSISTANT

## 2024-05-30 RX ORDER — GABAPENTIN 600 MG/1
TABLET ORAL
Qty: 90 TABLET | Refills: 5 | Status: SHIPPED | OUTPATIENT
Start: 2024-05-30 | End: 2024-11-30

## 2024-05-30 NOTE — PROGRESS NOTES
[address] [facility] [address]  [facility] [address]  Dept: [phone]    PATIENT NAME: Juvenal Campa  PATIENT MRN: [id number]  PRIMARY CARE PHYSICIAN: Bret Walton PA-C    HPI:      Juvenal Campa is a 54 y.o. female who returns to clinic today for evaluation of cervical radiculopathy with history of cervical fusion with Dr. Ortiz in 2018. Presently managed on Gabapentin 600 mg TID for management of cervical radiculopathy symptoms affecting left arm.    Today she reports occasional zapping sensation of left lateral posterior neck which is worse with neck extension. It does not radiate up the back of her head or down her shoulder. \"I spend half my day pressing my neck because it is very irritating.\" Gabapentin does not seem to help the sensation. There will occasional pain radiating down left arm, more noticeable if she is late for a dose of Gabapentin. No new left arm weakness. \"I know it's getting worse, I can feel it.\"     Denies leg weakness or imbalance.     Prior information:  Her discomfort is described as a deep ache radiating down the lateral aspect of her arm to dorsal 2nd,3rd finger ache. She has very infrequent cramping of forearm, triggered by certain positioning. Denies hand numbness or arm/ hand weakness. Denies skin discoloration.    No history of injections through pain management. Prior to her surgery she had very poor response to traction and other physical therapy exercises. She is averse to therapeutic injections.      Last MRI cervical was through  in 2022.     PREVIOUS WORKUP:     MRI brain w/wo Jan 2023:  There is no acute infarct. No mass  effect or midline shift. No evidence of an acute intracranial hemorrhage.  The ventricles and sulci are normal in size and configuration.  The  sellar/suprasellar regions appear unremarkable.  The normal signal voids  within the major intracranial vessels appear maintained.  No abnormal focus  of enhancement is seen within the brain.  A 17 mm

## 2024-06-15 RX ORDER — GABAPENTIN 600 MG/1
TABLET ORAL
Qty: 90 TABLET | Refills: 5 | OUTPATIENT
Start: 2024-06-15

## 2024-11-13 ENCOUNTER — OFFICE VISIT (OUTPATIENT)
Dept: NEUROLOGY | Age: 54
End: 2024-11-13
Payer: COMMERCIAL

## 2024-11-13 VITALS
WEIGHT: 174.8 LBS | HEART RATE: 75 BPM | SYSTOLIC BLOOD PRESSURE: 119 MMHG | DIASTOLIC BLOOD PRESSURE: 75 MMHG | BODY MASS INDEX: 25.89 KG/M2 | HEIGHT: 69 IN

## 2024-11-13 DIAGNOSIS — M54.12 CERVICAL RADICULOPATHY: Primary | ICD-10-CM

## 2024-11-13 DIAGNOSIS — M25.552 LEFT HIP PAIN: ICD-10-CM

## 2024-11-13 DIAGNOSIS — G93.0 ARACHNOID CYST: ICD-10-CM

## 2024-11-13 DIAGNOSIS — Z98.890 HISTORY OF CERVICAL SPINAL SURGERY: ICD-10-CM

## 2024-11-13 PROCEDURE — 99213 OFFICE O/P EST LOW 20 MIN: CPT | Performed by: PHYSICIAN ASSISTANT

## 2024-11-13 RX ORDER — GABAPENTIN 600 MG/1
TABLET ORAL
Qty: 90 TABLET | Refills: 5 | Status: SHIPPED | OUTPATIENT
Start: 2024-11-13 | End: 2025-05-16

## 2024-11-13 NOTE — PROGRESS NOTES
[address] [facility]  [facility] [address]  Dept: [phone]    PATIENT NAME: Juvenal Campa  PATIENT MRN: [id number]  PRIMARY CARE PHYSICIAN: Acosta Jerez APRN - CNP    HPI:      Juvenal Campa is a 54 y.o. female who returns to clinic today for evaluation of cervical radiculopathy with history of cervical fusion with Dr. Ortiz in 2018. Presently managed on Gabapentin 600 mg TID for management of cervical radiculopathy symptoms affecting left arm.    She did not follow up with [facility] due to transition in her job and today reports continued \"zapping sensation\" on the left up her neck and to left shoulder; never up the back of her head. Frequency is 3-4 episodes per week lasting hours if not treated with ice. This is noticeable when she is riding a motorcycle/ wearing a headache. There is no new leg weakness. She does admit some left arm weakness \"if I forget to take my medications on schedule, the pain is worse\" and there can be mild weakness. There can be mild aching down right arm as well.     She does feel that she has some brain fog which she attributes to gabapentin.     Balance is fair. She notes occasional veering to one side when walking \"like a tilt.\" No falls.     No updated MRI completed.     Prior information:    Her discomfort is described as a deep ache radiating down the lateral aspect of her arm to dorsal 2nd,3rd finger ache. She has very infrequent cramping of forearm, triggered by certain positioning. Denies hand numbness or arm/ hand weakness. Denies skin discoloration.    No history of injections through pain management. Prior to her surgery she had very poor response to traction and other physical therapy exercises. She is averse to therapeutic injections.      Last MRI cervical was through [facility] in 2022.     PREVIOUS WORKUP:     MRI brain w/wo Jan 2023:  There is no acute infarct. No mass  effect or midline shift. No evidence of an acute intracranial hemorrhage.  The ventricles

## 2025-01-15 ENCOUNTER — TELEPHONE (OUTPATIENT)
Dept: NEUROLOGY | Age: 55
End: 2025-01-15

## 2025-01-15 DIAGNOSIS — M54.12 CERVICAL RADICULOPATHY: Primary | ICD-10-CM

## 2025-01-15 RX ORDER — DIAZEPAM 5 MG/1
TABLET ORAL
Qty: 1 TABLET | Refills: 0 | Status: SHIPPED | OUTPATIENT
Start: 2025-01-15 | End: 2025-01-22

## 2025-01-15 NOTE — TELEPHONE ENCOUNTER
Patient called into office requesting a sedative for her upcoming MRI cervical spine. She stated that she has claustrophobia. She said that it is scheduled for 1/25/25 with [facility]. She would like for prescription to go to [facility] in [place].

## 2025-01-15 NOTE — TELEPHONE ENCOUNTER
Call placed to the patient and writer left a generic VM stating that a Rx was sent to her pharmacy and she could not drive the day she takes it.  Writer asked that she call the office if she had any further questions.

## 2025-01-28 ENCOUNTER — TELEPHONE (OUTPATIENT)
Dept: NEUROLOGY | Age: 55
End: 2025-01-28

## 2025-01-28 DIAGNOSIS — M54.12 CERVICAL RADICULOPATHY: ICD-10-CM

## 2025-01-28 NOTE — TELEPHONE ENCOUNTER
Juvenal called the office and this information was given.  Patient stated that she hasn't contacted Dr. Ortiz's office yet, however she will call them today.  Writer advised that I would fax over the previous referral, Jania's last office note and a copy of the MRI cervical spine report.  Juvenal denied having any knowledge regarding thyroid nodules.  Writer advised that Jania is suggesting a thyroid ultrasound, lab work and then to follow with PCP.  Juvenal stated that she will contact her PCP to see what they suggest.  Writer asked that she call back with this information including when she is scheduled with Dr. Ortiz and to call if there there any problems.  Patient verbally stated her understanding.

## 2025-01-28 NOTE — TELEPHONE ENCOUNTER
She follows with Diana and was supposed to re-establish. Does she have any appt upcoming with him?    Also if these thyroid nodules are unknown to her, we need a thyroid ultrasound and labs and she needs an appt with PCP to follow up on those results

## 2025-01-28 NOTE — TELEPHONE ENCOUNTER
Juvenal called the office back stating that she is scheduled to see Dr. Ortiz this Monday, February 3rd and her PCP Friday, February 7th.

## 2025-01-28 NOTE — TELEPHONE ENCOUNTER
Latha from [facility] radiology reading room ([phone]) called the office this morning to make sure we received the MRI cervical spine results for the patient.  She stated that there are \"significant findings\" and wanted to make sure we received the fax she sent this morning.  Writer advised that I would check with the front office and asked that she push the MRI films to [facility].  Latha advised that she would do this.  Writer confirmed th at MRI results were received and will forward this message to Jania for her review.

## 2025-03-12 ENCOUNTER — OFFICE VISIT (OUTPATIENT)
Dept: NEUROLOGY | Age: 55
End: 2025-03-12
Payer: COMMERCIAL

## 2025-03-12 VITALS
BODY MASS INDEX: 26.81 KG/M2 | SYSTOLIC BLOOD PRESSURE: 131 MMHG | WEIGHT: 181 LBS | HEIGHT: 69 IN | HEART RATE: 62 BPM | DIASTOLIC BLOOD PRESSURE: 75 MMHG

## 2025-03-12 DIAGNOSIS — R41.0 CONFUSION: ICD-10-CM

## 2025-03-12 DIAGNOSIS — M54.12 CERVICAL RADICULOPATHY: Primary | ICD-10-CM

## 2025-03-12 DIAGNOSIS — R53.1 EPISODIC WEAKNESS: ICD-10-CM

## 2025-03-12 DIAGNOSIS — R53.83 OTHER FATIGUE: ICD-10-CM

## 2025-03-12 PROCEDURE — 99214 OFFICE O/P EST MOD 30 MIN: CPT | Performed by: PHYSICIAN ASSISTANT

## 2025-03-12 NOTE — PROGRESS NOTES
Comment: rare    Drug use: Yes     Types: Marijuana (Weed)     Comment: On occasion    Sexual activity: Defer   Other Topics Concern    Not on file   Social History Narrative    Not on file     Social Drivers of Health     Financial Resource Strain: Not on file   Food Insecurity: No Food Insecurity (2/3/2025)    Received from [facility]    Hunger Screening     Within the past 12 months we worried whether our food would run out before we got money to buy more.: Never True     Within the past 12 months the food we bought just didn't last and we didn't have money to get more.: Never True   Transportation Needs: Not on file   Physical Activity: Not on file   Stress: Not on file   Social Connections: Not on file   Intimate Partner Violence: Not on file   Housing Stability: Not on file        Current Outpatient Medications   Medication Sig Dispense Refill    gabapentin (NEURONTIN) 600 MG tablet 1 tab po three times daily 90 tablet 5    Omega-3 Fatty Acids (FISH OIL) 300 MG CAPS Take 2,400 mg by mouth daily      estradiol (ESTRACE) 1 MG tablet Take 1 tablet by mouth daily      fenofibrate (TRIGLIDE) 160 MG tablet Take 1 tablet by mouth daily      progesterone (PROMETRIUM) 100 MG CAPS capsule TAKE 1 CAPSULE BY MOUTH EVERY DAY AT BEDTIME      valACYclovir (VALTREX) 500 MG tablet Take 1 tablet by mouth daily      ibuprofen (ADVIL;MOTRIN) 800 MG tablet  (Patient not taking: Reported on 3/12/2025)      atorvastatin (LIPITOR) 20 MG tablet Take 1 tablet by mouth daily (Patient not taking: Reported on 3/12/2025)       No current facility-administered medications for this visit.        No Known Allergies     REVIEW OF SYSTEMS:       All items selected indicate a positive finding.    Those items not selected are negative.  Constitutional [] Weight loss/gain   [] Fatigue  [] Fever/Chills   HEENT [] Hearing Loss  [] Visual Disturbance  [] Tinnitus  [] Eye pain  [] Vertigo   Respiratory [] Shortness of Breath  []

## 2025-04-18 ENCOUNTER — HOSPITAL ENCOUNTER (OUTPATIENT)
Dept: NEUROLOGY | Age: 55
Discharge: HOME | End: 2025-04-18
Payer: COMMERCIAL

## 2025-04-18 DIAGNOSIS — R53.1 EPISODIC WEAKNESS: ICD-10-CM

## 2025-04-18 DIAGNOSIS — R41.0 CONFUSION: ICD-10-CM

## 2025-04-18 DIAGNOSIS — R53.83 OTHER FATIGUE: ICD-10-CM

## 2025-04-18 PROCEDURE — 95816 EEG AWAKE AND DROWSY: CPT

## 2025-04-21 ENCOUNTER — RESULTS FOLLOW-UP (OUTPATIENT)
Dept: NEUROLOGY | Age: 55
End: 2025-04-21

## 2025-04-24 NOTE — TELEPHONE ENCOUNTER
----- Message from ADITHYA Ricketts sent at 4/21/2025  4:18 PM EDT -----  Focal abnormality, unsure if this correlates with her symptoms. I would recommend trial of oxcarbazepine 150 mg for 2 weeks and then increase to 300 mg BID prior to her f/u with me in May

## 2025-04-24 NOTE — TELEPHONE ENCOUNTER
Call placed to the patient and this information was given.  Patient verbally stated their understanding, however stated that she didn't want to start the medication until speaking with Jania.  Patient stated that she has known people who have had a lot of problems with seizure medications.  Writer offered a sooner appointment with Jania and patient was agreeable and scheduled for this Monday.

## 2025-04-28 ENCOUNTER — OFFICE VISIT (OUTPATIENT)
Dept: NEUROLOGY | Age: 55
End: 2025-04-28
Payer: COMMERCIAL

## 2025-04-28 VITALS
SYSTOLIC BLOOD PRESSURE: 114 MMHG | WEIGHT: 180 LBS | DIASTOLIC BLOOD PRESSURE: 68 MMHG | BODY MASS INDEX: 26.66 KG/M2 | HEIGHT: 69 IN

## 2025-04-28 DIAGNOSIS — R94.01 ABNORMAL EEG: ICD-10-CM

## 2025-04-28 DIAGNOSIS — M54.12 CERVICAL RADICULOPATHY: Primary | ICD-10-CM

## 2025-04-28 DIAGNOSIS — R53.83 OTHER FATIGUE: ICD-10-CM

## 2025-04-28 DIAGNOSIS — G93.0 ARACHNOID CYST: ICD-10-CM

## 2025-04-28 PROCEDURE — 99214 OFFICE O/P EST MOD 30 MIN: CPT | Performed by: PHYSICIAN ASSISTANT

## 2025-04-28 RX ORDER — MULTIVIT-MIN/IRON/FOLIC ACID/K 18-600-40
1000 CAPSULE ORAL
COMMUNITY
Start: 2025-01-01

## 2025-04-28 RX ORDER — OXCARBAZEPINE 150 MG/1
150 TABLET, FILM COATED ORAL 2 TIMES DAILY
Qty: 60 TABLET | Refills: 3 | Status: SHIPPED | OUTPATIENT
Start: 2025-04-28

## 2025-04-28 NOTE — PROGRESS NOTES
[address]  [place] [address]  Dept: [phone]    PATIENT NAME: Juvenal Campa  PATIENT MRN: [id number]  PRIMARY CARE PHYSICIAN: Acosta Jerez APRN - CNP    HPI:      Juvenal Campa is a 55 y.o. female who returns to clinic today for evaluation of cervical radiculopathy with history of cervical fusion with Dr. Ortiz in 2018. Presently managed on Gabapentin 600 mg TID for management of cervical radiculopathy symptoms affecting left arm.    Routine EEG April 2025: This EEG was abnormal.  Occasional left temporal polymorphic delta and theta slowing suggested underlying neuronal dysfunction.     CTA remain pending. 5 HIAA remains pending.     Symptoms are unchanged from prior.    Prior information:     Since last appointment she underwent thyroid bx which was benign for follow up regarding incidental findings to thyroid nodules.     Reports separately she has noticed episodic severe fatigue, out-of-body sensation, blurred vision \"feel like I'm stoned,\" extremity heaviness. Duration of episodes is 10-15 minutes. It will interrupt her ability to work. Frequency of these episodes is daily for at least the last year. These episodes are often post-prandial. She has never had an EGD. There are not specific dietary triggers, any food can cause this.    MRI brain March 2025: IMPRESSION:   Minimal white matter changes, most commonly related to mild chronic small vessel ischemia.   Small inferior frontal interhemispheric observation, likely arachnoid cyst.     MRI cervical Jan 2025: IMPRESSION:   * Postoperative changes at C5-C7 anterior cervical discectomy and fusion with junctional and degenerative arthropathy as described.   *  Thyroid nodules.  Recommend nonemergent thyroid ultrasound.     Prior information:    Her discomfort is described as a deep ache radiating down the lateral aspect of her arm to dorsal 2nd,3rd finger ache. She has very infrequent cramping of forearm, triggered by certain

## 2025-06-03 DIAGNOSIS — M54.12 CERVICAL RADICULOPATHY: ICD-10-CM

## 2025-06-05 RX ORDER — GABAPENTIN 600 MG/1
600 TABLET ORAL 3 TIMES DAILY
Qty: 90 TABLET | Refills: 2 | Status: SHIPPED | OUTPATIENT
Start: 2025-06-05 | End: 2025-09-03

## 2025-06-05 NOTE — TELEPHONE ENCOUNTER
Pharmacy requesting refill of Gabapentin 600 mg.    Medication active on med list yes    Date of last Rx: 11/13/2024 #90 with 5 refills   verified by AYESHA Batista    Date of last appointment 4/28/2025    Next Visit Date:  6/25/2025